# Patient Record
Sex: FEMALE | Race: WHITE | HISPANIC OR LATINO | Employment: FULL TIME | URBAN - METROPOLITAN AREA
[De-identification: names, ages, dates, MRNs, and addresses within clinical notes are randomized per-mention and may not be internally consistent; named-entity substitution may affect disease eponyms.]

---

## 2023-05-30 ENCOUNTER — OFFICE VISIT (OUTPATIENT)
Dept: URGENT CARE | Facility: CLINIC | Age: 28
End: 2023-05-30
Payer: COMMERCIAL

## 2023-05-30 VITALS
HEIGHT: 60 IN | HEART RATE: 102 BPM | OXYGEN SATURATION: 97 % | WEIGHT: 116.84 LBS | RESPIRATION RATE: 14 BRPM | SYSTOLIC BLOOD PRESSURE: 110 MMHG | BODY MASS INDEX: 22.94 KG/M2 | DIASTOLIC BLOOD PRESSURE: 72 MMHG | TEMPERATURE: 97.9 F

## 2023-05-30 DIAGNOSIS — J02.9 SORE THROAT: ICD-10-CM

## 2023-05-30 DIAGNOSIS — J34.89 NASAL CONGESTION WITH RHINORRHEA: ICD-10-CM

## 2023-05-30 DIAGNOSIS — R09.81 NASAL CONGESTION WITH RHINORRHEA: ICD-10-CM

## 2023-05-30 DIAGNOSIS — J06.9 VIRAL URI WITH COUGH: ICD-10-CM

## 2023-05-30 DIAGNOSIS — R05.1 ACUTE COUGH: ICD-10-CM

## 2023-05-30 LAB — S PYO DNA SPEC NAA+PROBE: NOT DETECTED

## 2023-05-30 PROCEDURE — 3078F DIAST BP <80 MM HG: CPT | Performed by: NURSE PRACTITIONER

## 2023-05-30 PROCEDURE — 3074F SYST BP LT 130 MM HG: CPT | Performed by: NURSE PRACTITIONER

## 2023-05-30 PROCEDURE — 87651 STREP A DNA AMP PROBE: CPT | Performed by: NURSE PRACTITIONER

## 2023-05-30 PROCEDURE — 99203 OFFICE O/P NEW LOW 30 MIN: CPT | Performed by: NURSE PRACTITIONER

## 2023-05-30 RX ORDER — LIDOCAINE HYDROCHLORIDE 20 MG/ML
5 SOLUTION OROPHARYNGEAL EVERY 6 HOURS PRN
Qty: 140 ML | Refills: 0 | Status: SHIPPED | OUTPATIENT
Start: 2023-05-30 | End: 2023-06-06

## 2023-05-30 ASSESSMENT — ENCOUNTER SYMPTOMS
HEADACHES: 0
COUGH: 1
SINUS PAIN: 0
SHORTNESS OF BREATH: 0
EYE REDNESS: 0
SPUTUM PRODUCTION: 0
CHILLS: 0
CARDIOVASCULAR NEGATIVE: 1
VOMITING: 0
ABDOMINAL PAIN: 0
DIZZINESS: 0
DIARRHEA: 0
WEAKNESS: 0
CONSTIPATION: 0
SORE THROAT: 1
NECK PAIN: 0
FEVER: 1
WHEEZING: 0
MYALGIAS: 0
EYE DISCHARGE: 0
NAUSEA: 0

## 2023-05-30 NOTE — PROGRESS NOTES
Subjective     Payton Peters is a 28 y.o. female who presents with Sore Throat (Sore Throat, URI Sx. Pt concerned about possible strep. )            HPI  Experiencing nasal congestion, runny nose, postnasal drainage, sore throat, cough and low-grade fever x4 days.  States using Afrin, Sudafed, salt water gargle.  Tried DayQuil as well.  Sore throat has worsened.  Concerned for strep.  No noted nausea, vomiting, diarrhea.  Denies shortness of breath or wheeze with cough.    PMH:  has no past medical history on file.  MEDS:   Current Outpatient Medications:     lidocaine (XYLOCAINE) 2 % Solution, Take 5 mL by mouth every 6 hours as needed for Throat/Mouth Pain for up to 7 days. Gargle and spit out after salt water gargle., Disp: 140 mL, Rfl: 0  ALLERGIES:   Allergies   Allergen Reactions    Avocado Hives and Swelling     Swelling of throat     Justicia Adhatoda Hives and Swelling     Throat swells    Charentais Melon (French Melon) Itching     SURGHX: History reviewed. No pertinent surgical history.  SOCHX:    FH: Family history was reviewed, no pertinent findings to report    Review of Systems   Constitutional:  Positive for fever and malaise/fatigue. Negative for chills.   HENT:  Positive for congestion and sore throat. Negative for ear pain and sinus pain.    Eyes:  Negative for discharge and redness.   Respiratory:  Positive for cough. Negative for sputum production, shortness of breath and wheezing.    Cardiovascular: Negative.    Gastrointestinal:  Negative for abdominal pain, constipation, diarrhea, nausea and vomiting.   Musculoskeletal:  Negative for myalgias and neck pain.   Skin:  Negative for itching and rash.   Neurological:  Negative for dizziness, weakness and headaches.   Endo/Heme/Allergies:  Negative for environmental allergies.   All other systems reviewed and are negative.             Objective     /72   Pulse (!) 102   Temp 36.6 °C (97.9 °F) (Temporal)   Resp 14   Ht 1.524 m (5')   Wt  53 kg (116 lb 13.5 oz)   SpO2 97%   BMI 22.82 kg/m²      Physical Exam  Vitals reviewed.   Constitutional:       General: She is awake. She is not in acute distress.     Appearance: Normal appearance. She is well-developed. She is not ill-appearing, toxic-appearing or diaphoretic.   HENT:      Head: Normocephalic.      Right Ear: Ear canal and external ear normal. A middle ear effusion is present.      Left Ear: Ear canal and external ear normal. A middle ear effusion is present.      Nose: Mucosal edema, congestion and rhinorrhea present.      Left Turbinates: Swollen.      Mouth/Throat:      Lips: Pink.      Mouth: Mucous membranes are dry.      Pharynx: Uvula midline. Posterior oropharyngeal erythema present. No pharyngeal swelling, oropharyngeal exudate or uvula swelling.      Tonsils: No tonsillar exudate or tonsillar abscesses. 1+ on the right. 1+ on the left.   Eyes:      Conjunctiva/sclera: Conjunctivae normal.      Pupils: Pupils are equal, round, and reactive to light.   Cardiovascular:      Rate and Rhythm: Normal rate.   Pulmonary:      Effort: Pulmonary effort is normal.      Breath sounds: Normal breath sounds and air entry.   Musculoskeletal:         General: Normal range of motion.      Cervical back: Normal range of motion and neck supple.   Skin:     General: Skin is warm and dry.   Neurological:      Mental Status: She is alert and oriented to person, place, and time.   Psychiatric:         Attention and Perception: Attention normal.         Mood and Affect: Mood normal.         Speech: Speech normal.         Behavior: Behavior normal. Behavior is cooperative.                             Assessment & Plan        1. Sore throat    - POCT GROUP A STREP, PCR  - lidocaine (XYLOCAINE) 2 % Solution; Take 5 mL by mouth every 6 hours as needed for Throat/Mouth Pain for up to 7 days. Gargle and spit out after salt water gargle.  Dispense: 140 mL; Refill: 0    2. Nasal congestion with rhinorrhea      3.  Acute cough      4. Viral URI with cough      -Discussed side effect on longer durations of oral decongestants due to drying effect that can contribute to increased nasal sinus/ear pressures and drier oral mucus membranes- recommend to discontinue at this time  -Recommend the following:  -Maintain hydration/water intake  -May use over the counter Ibuprofen/Tylenol as needed for any fever, body aches or ear/throat pain  -May take long acting antihistamine (avoid decongestant forms) for any nasal congestion/runny nose/post nasal drip symptoms as needed  -May use over the counter saline nasal spray for nasal congestion/post nasal drip as needed  -Recommend use over the counter Nasacort/Flonase for nasal decongestion as needed (discontinue Afrin)  -May use throat lozenges for throat discomfort as needed   -Change toothbrush   -May gargle with salt water up to 4x/day as needed for throat discomfort (1 tsp salt dissolved in 1 cup warm water)  -May drink smoothies/soft foods or warm liquids if too painful to swallow solid foods  -Monitor for any increased throat pain, difficulty swallowing/breathing or speaking, fever, ear pain- must be re-evaluated in urgent care or emergency room

## 2024-03-10 ENCOUNTER — OFFICE VISIT (OUTPATIENT)
Dept: URGENT CARE | Facility: PHYSICIAN GROUP | Age: 29
End: 2024-03-10
Payer: OTHER MISCELLANEOUS

## 2024-03-10 VITALS
HEIGHT: 60 IN | SYSTOLIC BLOOD PRESSURE: 112 MMHG | RESPIRATION RATE: 14 BRPM | OXYGEN SATURATION: 98 % | TEMPERATURE: 98.7 F | BODY MASS INDEX: 25.13 KG/M2 | DIASTOLIC BLOOD PRESSURE: 74 MMHG | WEIGHT: 128 LBS | HEART RATE: 80 BPM

## 2024-03-10 DIAGNOSIS — B96.89 ACUTE BACTERIAL SINUSITIS: ICD-10-CM

## 2024-03-10 DIAGNOSIS — J01.90 ACUTE BACTERIAL SINUSITIS: ICD-10-CM

## 2024-03-10 PROCEDURE — 99213 OFFICE O/P EST LOW 20 MIN: CPT | Performed by: NURSE PRACTITIONER

## 2024-03-10 PROCEDURE — 3074F SYST BP LT 130 MM HG: CPT | Performed by: NURSE PRACTITIONER

## 2024-03-10 PROCEDURE — 3078F DIAST BP <80 MM HG: CPT | Performed by: NURSE PRACTITIONER

## 2024-03-10 RX ORDER — AMOXICILLIN AND CLAVULANATE POTASSIUM 875; 125 MG/1; MG/1
1 TABLET, FILM COATED ORAL 2 TIMES DAILY
Qty: 14 TABLET | Refills: 0 | Status: SHIPPED | OUTPATIENT
Start: 2024-03-10 | End: 2024-03-17

## 2024-03-10 ASSESSMENT — ENCOUNTER SYMPTOMS
MUSCULOSKELETAL NEGATIVE: 1
SORE THROAT: 1
CONSTITUTIONAL NEGATIVE: 1
HEADACHES: 1
GASTROINTESTINAL NEGATIVE: 1
CHILLS: 0
FEVER: 0
SINUS PRESSURE: 1
EYES NEGATIVE: 1
PSYCHIATRIC NEGATIVE: 1
COUGH: 1
SHORTNESS OF BREATH: 0
HOARSE VOICE: 1
CARDIOVASCULAR NEGATIVE: 1
SINUS PAIN: 1

## 2024-03-10 NOTE — PROGRESS NOTES
Subjective:   Payton Peters is a 28 y.o. female who presents for Congestion (X 3 days) and Cough (X 3 days)      Sinus Problem  This is a new problem. Episode onset: 6 days. The problem has been gradually worsening since onset. There has been no fever. The pain is moderate. Associated symptoms include congestion, coughing, headaches, a hoarse voice, sinus pressure and a sore throat. Pertinent negatives include no chills, ear pain, shortness of breath or sneezing. Past treatments include oral decongestants, nasal decongestants and saline nose sprays. The treatment provided mild relief.       Review of Systems   Constitutional: Negative.  Negative for chills, fever and malaise/fatigue.   HENT:  Positive for congestion, hoarse voice, sinus pressure, sinus pain and sore throat. Negative for ear pain and sneezing.    Eyes: Negative.    Respiratory:  Positive for cough. Negative for shortness of breath.    Cardiovascular: Negative.    Gastrointestinal: Negative.    Genitourinary: Negative.    Musculoskeletal: Negative.    Skin: Negative.    Neurological:  Positive for headaches.   Endo/Heme/Allergies: Negative.    Psychiatric/Behavioral: Negative.     All other systems reviewed and are negative.      Medications, Allergies, and current problem list reviewed today in Epic.     Objective:     /74   Pulse 80   Temp 37.1 °C (98.7 °F) (Temporal)   Resp 14   Ht 1.524 m (5')   Wt 58.1 kg (128 lb)   SpO2 98%     Physical Exam  Vitals reviewed.   Constitutional:       General: She is not in acute distress.     Appearance: Normal appearance. She is ill-appearing.   HENT:      Head: Normocephalic and atraumatic.      Right Ear: Tympanic membrane, ear canal and external ear normal.      Left Ear: Tympanic membrane, ear canal and external ear normal.      Nose: Nasal tenderness and congestion present.      Right Nostril: Occlusion present.      Left Nostril: Occlusion present.      Right Turbinates: Enlarged.      Left  Turbinates: Enlarged.      Right Sinus: Maxillary sinus tenderness and frontal sinus tenderness present.      Left Sinus: Maxillary sinus tenderness and frontal sinus tenderness present.      Mouth/Throat:      Mouth: Mucous membranes are moist.      Pharynx: Oropharynx is clear. Posterior oropharyngeal erythema present.   Eyes:      Extraocular Movements: Extraocular movements intact.      Conjunctiva/sclera: Conjunctivae normal.      Pupils: Pupils are equal, round, and reactive to light.   Cardiovascular:      Rate and Rhythm: Normal rate and regular rhythm.      Pulses: Normal pulses.      Heart sounds: Normal heart sounds.   Pulmonary:      Effort: Pulmonary effort is normal.      Breath sounds: Normal breath sounds. No wheezing, rhonchi or rales.   Abdominal:      General: Abdomen is flat.      Palpations: Abdomen is soft.   Musculoskeletal:         General: Normal range of motion.      Cervical back: Normal range of motion and neck supple. No tenderness.   Lymphadenopathy:      Cervical: No cervical adenopathy.   Skin:     General: Skin is warm and dry.      Capillary Refill: Capillary refill takes less than 2 seconds.   Neurological:      General: No focal deficit present.      Mental Status: She is alert.   Psychiatric:         Mood and Affect: Mood normal.         Behavior: Behavior normal.         Assessment/Plan:     Diagnosis and associated orders:     1. Acute bacterial sinusitis  amoxicillin-clavulanate (AUGMENTIN) 875-125 MG Tab         Comments/MDM:     Due to duration of symptoms, sinus tenderness, and failure of OTC therapies- ABX was written to tx. For bacterial etiology for sinusitis.   Continue OTC supportive therapies- Flonase, OTC allergy meds, avoid night time dairy. Increase fluids. Humidification.   Patient given precautionary s/sx that mandate immediate follow up and evaluation in the ED. Advised of risks of not doing so.    DDX, Supportive care, and indications for immediate follow-up  discussed with patient.    Instructed to return to clinic or nearest emergency department if we are not available for any change in condition, further concerns, or worsening of symptoms.    The patient demonstrated a good understanding and agreed with the treatment plan           Differential diagnosis, natural history, supportive care, and indications for immediate follow-up discussed.    Advised the patient to follow-up with the primary care physician for recheck, reevaluation, and consideration of further management.    Please note that this dictation was created using voice recognition software. I have made a reasonable attempt to correct obvious errors, but I expect that there are errors of grammar and possibly content that I did not discover before finalizing the note.    This note was electronically signed by HILTON Ahmadi